# Patient Record
Sex: FEMALE | Race: WHITE | URBAN - METROPOLITAN AREA
[De-identification: names, ages, dates, MRNs, and addresses within clinical notes are randomized per-mention and may not be internally consistent; named-entity substitution may affect disease eponyms.]

---

## 2017-10-05 ENCOUNTER — OFFICE VISIT (OUTPATIENT)
Dept: FAMILY MEDICINE CLINIC | Age: 67
End: 2017-10-05

## 2017-10-05 VITALS
RESPIRATION RATE: 17 BRPM | DIASTOLIC BLOOD PRESSURE: 73 MMHG | HEART RATE: 79 BPM | WEIGHT: 122 LBS | BODY MASS INDEX: 23.95 KG/M2 | HEIGHT: 60 IN | TEMPERATURE: 96.4 F | SYSTOLIC BLOOD PRESSURE: 136 MMHG

## 2017-10-05 DIAGNOSIS — E78.2 MIXED HYPERLIPIDEMIA: Primary | ICD-10-CM

## 2017-10-05 DIAGNOSIS — E55.9 VITAMIN D DEFICIENCY: ICD-10-CM

## 2017-10-05 NOTE — PATIENT INSTRUCTIONS
Exercise    Exercise daily   - Start slow, gradually increase your time by around 10 to 20 % a week    - To prevent injury, take a recovery week every 4 weeks (reduce your exercise time and intensity by 1/2 during this time)    - Your goal is to work up to 150 min a week; hard enough that you can't whistle or sing. It may take 6 months to work up to this. - Call the Health  at [facility] for exercise ideas ([phone])        Books to [address] [place] [address]    1. Change Anything: [employer] of Personal Success  by Leeanna Balloon, Elfabian Roots, and Artur Ripa    2. Mindless Eating  by Alice Nichols    3. Perfectly Yourself  by Crow Terrell    4. Me, Myself and I - 28 Days to Creative Self-Love  by Charlotte Feldman version only      Supplements      1. Vitacost Synergy Basic Multi-Vitamin (Their In-House Brand)      Take 1 capsule twice a day        Found ONLY at [facility], NOT at [facility], [place], [facility], the Vitamin Shoppe, etc      SKU #: [id number]       $16.49   / 1 month supply      www. [employer]case  [phone]     2. Turmeric with Black Pepper Extract (or Bioperine) 500 mg      1 pill time a day (more is joint pain or increased inflammation)      Found at many stores but Vit[employer]ost has a good price:      SKU #: [id number]  $13.64 / 60 pills      www. [employer]  [phone]       3. Source Naturals: Theanine Serine 1 pill twice a day   Vitamin BitLeap, Amazon or Vitacost     4. Red Yeast Rice 1200 mg at bed time    5.  Fish Oil      Take 1 capsule daily                             FYI:   Typical fish oil per pill =  mg and  mg  Krill oil per pill = EPA 50 - 70 mg and DHA 27 - 250 mg            Drink Filtered Water    My favorite water filter (also alkalinizes your water - much better than Lesley)    pH REFRESH Alkaline Water Pitcher Ionizer With 2 Long-Life Filters - Alkaline  Machine - Ionized Water Alkalizer Filter, 84oz, 2.5L (2568 Model) (White)   Sold on [employer] w/ Free Shipping        ^^^^^^^^^^^^^^^^^^^^^^^^^^^^^^^^^^^^^^^^^^^^^^^^^^^^^^^^^^^^^^^^^^^^^^^^^^^^^^^^^^^^^^^^^^^^^^^      Carbohydrates     If you do not metabolize carbohydrates well, you will lose weight and keep the weigh off by keeping your carbohydrate intake low. How do you know if you do not metabolize carbs well? If your Triglycerides, sdLCL-C and Insulin Resistance are elevated, then you will do well to follow a low carb diet. Fun Facts About Carbs    - The Typical American Diet = 450 grams a day    - The Reducing Phase of the VLCD = 40 grams a day    - Target for weight loss maintenance:   - Eat no more than 30 grams per meal   - Eat no more than 10 grams per snack (mid-morning and mid-afternoon snack)        Resources for finding out where carbs hide in our foods:  1. Our Registered Dietitians    2. Www. Atkins. com/tools    3. Read food labels    4. Books       - The Calorie Lu Chilton       - The Fonda System Diet for a New You       - Bee De Leon's NEW Carb and Calorie 4th Edition (my favorite)    5. Smart phone and Internet-based apps       - Compliance SciencePal        - Carb Manager      If you want to get a better picture of your cardiac risk, consider getting a coronary calcium score:  Call [phone] to schedule one. Hyperlipidemia: After Your Visit  Your Care Instructions  Hyperlipidemia is too much fat in your blood. The body has several kinds of fat, including cholesterol and triglycerides. Your body needs fat for many things, such as making new cells. But too much fat in your blood increases your chances of having a heart attack or stroke. You may be able to lower your cholesterol and triglycerides with a heart-healthy diet, exercise, and if needed, medicine. Your doctor may want you to try lifestyle changes first to see whether they lower the fat in your blood.  You may need to take medicine if lifestyle changes do not lower the fat in your blood enough. Follow-up care is a key part of your treatment and safety. Be sure to make and go to all appointments, and call your doctor if you are having problems. Its also a good idea to know your test results and keep a list of the medicines you take. How can you care for yourself at home? Take your medicines  · Take your medicines exactly as prescribed. Call your doctor if you think you are having a problem with your medicine. · If you take medicine to lower your cholesterol, go to follow-up visits. You will need to have blood tests. · Do not take large doses of niacin, which is a B vitamin, while taking medicine called statins. It may increase the chance of muscle pain and liver problems. · Talk to your doctor about avoiding grapefruit juice if you are taking statins. Grapefruit juice can raise the level of this medicine in your blood. This could increase side effects. Eat more fruits, vegetables, and fiber  · Fruits and vegetables have lots of nutrients that help protect against heart disease, and they have littleif anyfat. Try to eat at least five servings a day. Dark green, deep orange, or yellow fruits and vegetables are healthy choices. · Keep carrots, celery, and other veggies handy for snacks. Buy fruit that is in season and store it where you can see it so that you will be tempted to eat it. Cook dishes that have a lot of veggies in them, such as stir-fries and soups. · Foods high in fiber may reduce your cholesterol and provide important vitamins and minerals. High-fiber foods include whole-grain cereals and breads, oatmeal, beans, brown rice, citrus fruits, and apples. · Buy whole-grain breads and cereals instead of white bread and pastries. Limit saturated fat  · Read food labels and try to avoid saturated fat and trans fat. They increase your risk of heart disease. · Use olive or canola oil when you cook. Try cholesterol-lowering spreads, such as Benecol or Take Control.   · Bake, broil, grill, or steam foods instead of frying them. · Limit the amount of high-fat meats you eat, including hot dogs and sausages. Cut out all visible fat when you prepare meat. · Eat fish, skinless poultry, and soy products such as tofu instead of high-fat meats. Soybeans may be especially good for your heart. Eat at least two servings of fish a week. Certain fish, such as salmon, contain omega-3 fatty acids, which may help reduce your risk of heart attack. · Choose low-fat or fat-free milk and dairy products. Get exercise, limit alcohol, and quit smoking  · Get more exercise. Work with your doctor to set up an exercise program. Even if you can do only a small amount, exercise will help you get stronger, have more energy, and manage your weight and your stress. Walking is an easy way to get exercise. Gradually increase the amount you walk every day. Aim for at least 30 minutes on most days of the week. You also may want to swim, bike, or do other activities. · Limit alcohol to no more than 2 drinks a day for men and 1 drink a day for women. · Do not smoke. If you need help quitting, talk to your doctor about stop-smoking programs and medicines. These can increase your chances of quitting for good. When should you call for help? Call 911 anytime you think you may need emergency care. For example, call if:  · You have symptoms of a heart attack. These may include:  ¨ Chest pain or pressure, or a strange feeling in the chest.  ¨ Sweating. ¨ Shortness of breath. ¨ Nausea or vomiting. ¨ Pain, pressure, or a strange feeling in the back, neck, jaw, or upper belly or in one or both shoulders or arms. ¨ Lightheadedness or sudden weakness. ¨ A fast or irregular heartbeat. After you call 911, the  may tell you to chew 1 adult-strength or 2 to 4 low-dose aspirin. Wait for an ambulance. Do not try to drive yourself. · You have signs of a stroke.  These may include:  ¨ Sudden numbness, paralysis, or weakness in your face, arm, or leg, especially on only one side of your body. ¨ New problems with walking or balance. ¨ Sudden vision changes. ¨ Drooling or slurred speech. ¨ New problems speaking or understanding simple statements, or feeling confused. ¨ A sudden, severe headache that is different from past headaches. · You passed out (lost consciousness). Call your doctor now or seek immediate medical care if:  · You have muscle pain or weakness. Watch closely for changes in your health, and be sure to contact your doctor if:  · You are very tired. · You have an upset stomach, gas, constipation, or belly pain or cramps. Where can you learn more? Go to [employer].be  Enter C406 in the search box to learn more about \"Hyperlipidemia: After Your Visit. \"   © 1174-5919 Healthwise, Incorporated. Care instructions adapted under license by Addis Muñoz (which disclaims liability or warranty for this information). This care instruction is for use with your licensed healthcare professional. If you have questions about a medical condition or this instruction, always ask your healthcare professional. [address] any warranty or liability for your use of this information.   Content Version: 3.1.445442; Last Revised: October 13, 2011

## 2017-10-05 NOTE — MR AVS SNAPSHOT
Visit Information Date & Time Provider Department Dept. Phone Encounter #  
 10/5/2017 11:00 AM Clementine Berry 13 [id number] Upcoming Health Maintenance Date Due Hepatitis C Screening 1950 DTaP/Tdap/Td series (1 - Tdap) 12/12/1971 BREAST CANCER SCRN MAMMOGRAM 12/12/2000 FOBT Q 1 YEAR AGE 50-75 12/12/2000 ZOSTER VACCINE AGE 60> 10/12/2010 GLAUCOMA SCREENING Q2Y 12/12/2015 OSTEOPOROSIS SCREENING (DEXA) 12/12/2015 Pneumococcal 65+ Low/Medium Risk (1 of 2 - PCV13) 12/12/2015 MEDICARE YEARLY EXAM 12/12/2015 INFLUENZA AGE 9 TO ADULT 8/1/2017 Allergies as of 10/5/2017  Review Complete On: 10/5/2017 By: Billy Brewer LPN Severity Noted Reaction Type Reactions Penicillins  10/05/2017    Itching Current Immunizations  Reviewed on 10/5/2017 No immunizations on file. Reviewed by Billy Brewer LPN on 99/4/9144 at 85:41 AM  
Vitals BP Pulse Temp Resp Height(growth percentile) Weight(growth percentile) 136/73 79 96.4 °F (35.8 °C) (Oral) 17 5' (1.524 m) 122 lb (55.3 kg) BMI OB Status Smoking Status 23.83 kg/m2 Postmenopausal Current Every Day Smoker Vitals History BMI and BSA Data Body Mass Index Body Surface Area  
 23.83 kg/m 2 1.53 m 2 Your Updated Medication List  
  
Notice  As of 10/5/2017 11:51 AM  
 You have not been prescribed any medications. Patient Instructions Exercise Exercise daily  
- Start slow, gradually increase your time by around 10 to 20 % a week - To prevent injury, take a recovery week every 4 weeks (reduce your exercise time and intensity by 1/2 during this time) - Your goal is to work up to 150 min a week; hard enough that you can't whistle or sing. It may take 6 months to work up to this. - Call the Health  at [facility] labs for exercise ideas ([phone]) Books to [address] [place] [address] 1. Change Anything: [employer] of Personal Success 
by Bola Saldana, Carmen Brasher, and Jeronimo Osorio 2. Mindless Eating 
by Julissa Aguilar 3. Perfectly Yourself 
by Shiva Waters 4. Me, Myself and I - 28 Days to Creative Self-Love 
by Mike Drummond version only Supplements 1. Vitacost Synergy Basic Multi-Vitamin (Their In-House Brand) Take 1 capsule twice a day Found ONLY at [facility], NOT at [facility], [facility], the Vitamin Shoppe, etc 
    SKU #: [id number]  
    $16.49   / 1 month supply 
    www. [employer]itaCost.com  [phone] 2. Turmeric with Black Pepper Extract (or Bioperine) 500 mg 
    1 pill time a day (more is joint pain or increased inflammation) Found at many stores but [address] has a good price: 
    SKU #: [id number]  $13.64 / 61 pills 
    www. [employer]  [phone] 3. Source Naturals: Theanine Serine 1 pill twice a day Vitamin Shoppe, [employer] or Vitacost 
  
4. Red Yeast Rice 1200 mg at bed time 5. Fish Oil Take 1 capsule daily FYI:  
Typical fish oil per pill =  mg and  mg 
Krill oil per pill = EPA 50 - 70 mg and DHA 27 - 250 mg 
 
 
 
 
 
Drink Filtered Water My favorite water filter (also alkalinizes your water - much better than Lesley) pH REFRESH Alkaline Water Pitcher Ionizer With 2 Long-Life Filters - Alkaline  Machine - Ionized Water Alkalizer Filter, 84oz, 2.5L (2017 Model) Elder Lauraside) Sold on [employer] w/ Free Shipping 
 
 
 
^^^^^^^^^^^^^^^^^^^^^^^^^^^^^^^^^^^^^^^^^^^^^^^^^^^^^^^^^^^^^^^^^^^^^^^^^^^^^^^^^^^^^^^^^^^^^^^ Carbohydrates If you do not metabolize carbohydrates well, you will lose weight and keep the weigh off by keeping your carbohydrate intake low. How do you know if you do not metabolize carbs well?   If your Triglycerides, sdLCL-C and Insulin Resistance are elevated, then you will do well to follow a low carb diet. Fun Facts About Carbs - The Typical American Diet = 450 grams a day - The Reducing Phase of the VLCD = 40 grams a day - Target for weight loss maintenance: 
 - Eat no more than 30 grams per meal 
 - Eat no more than 10 grams per snack (mid-morning and mid-afternoon snack) Resources for finding out where carbs hide in our foods: 
1. Our Registered Dietitians 2. Www. Atkins. com/tools 3. Read food labels 4. Books - The Calorie [place] - The New Atkins Diet for a New You 
     - Bee De Leon's NEW Carb and Calorie 4th Edition (my favorite) 5. Smart phone and Internet-based apps - HoneyFitMiragen TherapeuticsPal  
     - Carb Manager If you want to get a better picture of your cardiac risk, consider getting a coronary calcium score:  Call [phone] to schedule one. Introducing [facility]! Juan Alejandre introduces [employer] patient portal. Now you can access parts of your medical record, email your doctor's office, and request medication refills online. 1. In your internet browser, go to [email or web address] 2. Click on the First Time User? Click Here link in the Sign In box. You will see the New Member Sign Up page. 3. Enter your [employer] Machine Access Code exactly as it appears below. You will not need to use this code after youve completed the sign-up process. If you do not sign up before the expiration date, you must request a new code. · Le[employer] Start[employer] Machine Access Code: [email or web address] Expires: 1/3/2018 11:51 AM 
 
4. Enter the last four digits of your Social Security Number (xxxx) and Date of Birth (mm/dd/yyyy) as indicated and click Submit. You will be taken to the next sign-up page. 5. Create a Asset Mappingt ID. This will be your Le[employer] Start[employer] Machine login ID and cannot be changed, so think of one that is secure and easy to remember. 6. Create a Asset Mappingt password. You can change your password at any time. 7. Enter your Password Reset Question and Answer. This can be used at a later time if you forget your password. 8. Enter your e-mail address. You will receive e-mail notification when new information is available in [address] 9. Click Sign Up. You can now view and download portions of your medical record. 10. Click the Download Summary menu link to download a portable copy of your medical information. If you have questions, please visit the Frequently Asked Questions section of the [employer] website. Remember, [employer] is NOT to be used for urgent needs. For medical emergencies, dial 911. Now available from your iPhone and Android! Please provide this summary of care documentation to your next provider. If you have any questions after today's visit, please call [phone].

## 2017-10-06 PROBLEM — E55.9 VITAMIN D DEFICIENCY: Status: ACTIVE | Noted: 2017-10-06

## 2017-10-06 PROBLEM — E78.2 MIXED HYPERLIPIDEMIA: Status: ACTIVE | Noted: 2017-10-06

## 2017-10-06 NOTE — PROGRESS NOTES
Elevated Cholesterol  Pt been counseled for this previously  Not on Rx at this time  She came to me wanting a 2nd opinion about her prognosis and treatment options    Vit D deficiency  Pt not aware she has this    Review of Systems   Constitutional: Negative. Respiratory: Negative. Cardiovascular: Negative. Gastrointestinal: Negative. Neurological: Negative. Physical Exam   Constitutional: She is oriented to person, place, and time. She appears well-developed and well-nourished. /73  Pulse 79  Temp 96.4 °F (35.8 °C) (Oral)   Resp 17  Ht 5' (1.524 m)  Wt 122 lb (55.3 kg)  BMI 23.83 kg/m2     Cardiovascular: Normal rate. Pulmonary/Chest: Effort normal.   Neurological: She is alert and oriented to person, place, and time. Psychiatric: She has a normal mood and affect. Her behavior is normal.   Nursing note and vitals reviewed. Diagnoses and all orders for this visit:    1. Mixed hyperlipidemia    2. Vitamin D deficiency      Pt wants to try managing the above with OTC supplements. Recheck in 3 - 6 months   See patient instructions    20 minutes of the 25 minutes face to face time with Sha Dobson consisted of counseling & coordinating and/or discussing treatment plans in reference to her The primary encounter diagnosis was Mixed hyperlipidemia. A diagnosis of Vitamin D deficiency was also pertinent to this visit.